# Patient Record
Sex: FEMALE | Race: WHITE | NOT HISPANIC OR LATINO | URBAN - METROPOLITAN AREA
[De-identification: names, ages, dates, MRNs, and addresses within clinical notes are randomized per-mention and may not be internally consistent; named-entity substitution may affect disease eponyms.]

---

## 2023-06-15 ENCOUNTER — TELEPHONE (OUTPATIENT)
Dept: GYNECOLOGIC ONCOLOGY | Facility: CLINIC | Age: 43
End: 2023-06-15

## 2023-06-15 NOTE — TELEPHONE ENCOUNTER
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 Her mother was diagnosed with triple negative PDL1-9 negative breast cancer. She states that her mother had cystic breasts prior to her diagnosis. Once mother was diagnosed she was encouraged to start mammograms which was more than 2 years ago. At her first mammogram she was informed that she had cystic breasts as well and was encouraged to get imaging every 6 months with alternating mammograms and ultrasounds. Patient has decided to stay in [place] and wants to establish care with [facility]. Advised that she would need to get the last 5 years of imaging sent to  before they would do mammogram. She will contact [facility]ec[facility] to see if they are able to powershare images. She did not mention seeing a genetics counselor. Original mammogram was done in [place]. Appt given. Forwarding to [facility] as an FYI.

## 2023-06-29 PROBLEM — Z91.89 AT HIGH RISK FOR BREAST CANCER: Status: ACTIVE | Noted: 2023-06-29

## 2023-08-18 DIAGNOSIS — N30.10 INTERSTITIAL CYSTITIS: Primary | ICD-10-CM

## 2023-08-18 LAB
BACTERIA UR QL AUTO: NORMAL /HPF
BILIRUB UR QL STRIP: NEGATIVE
CLARITY UR: CLEAR
COLOR UR: YELLOW
GLUCOSE UR STRIP-MCNC: NEGATIVE MG/DL
HGB UR QL STRIP.AUTO: NEGATIVE
HYALINE CASTS UR QL AUTO: NORMAL /LPF
KETONES UR QL STRIP: NEGATIVE
LEUKOCYTE ESTERASE UR QL STRIP.AUTO: NEGATIVE
NITRITE UR QL STRIP: NEGATIVE
PH UR STRIP.AUTO: 6 [PH] (ref 5–8)
PROT UR QL STRIP: NEGATIVE
RBC # UR STRIP: NORMAL /HPF
REF LAB TEST METHOD: NORMAL
SP GR UR STRIP: 1.02 (ref 1–1.03)
SQUAMOUS #/AREA URNS HPF: NORMAL /HPF
UROBILINOGEN UR QL STRIP: NORMAL
WBC # UR STRIP: NORMAL /HPF

## 2023-08-18 PROCEDURE — 87086 URINE CULTURE/COLONY COUNT: CPT | Performed by: UROLOGY

## 2023-08-18 PROCEDURE — 81001 URINALYSIS AUTO W/SCOPE: CPT | Performed by: UROLOGY

## 2023-08-18 RX ORDER — SULFAMETHOXAZOLE AND TRIMETHOPRIM 800; 160 MG/1; MG/1
1 TABLET ORAL 2 TIMES DAILY
Qty: 20 TABLET | Refills: 0 | Status: SHIPPED | OUTPATIENT
Start: 2023-08-18

## 2023-08-18 RX ORDER — PHENAZOPYRIDINE HYDROCHLORIDE 200 MG/1
200 TABLET, FILM COATED ORAL 3 TIMES DAILY PRN
Qty: 20 TABLET | Refills: 0 | Status: SHIPPED | OUTPATIENT
Start: 2023-08-18

## 2023-08-19 LAB — BACTERIA SPEC AEROBE CULT: NO GROWTH

## 2023-08-24 ENCOUNTER — OFFICE VISIT (OUTPATIENT)
Dept: UROLOGY | Facility: CLINIC | Age: 43
End: 2023-08-24
Payer: COMMERCIAL

## 2023-08-24 ENCOUNTER — TELEPHONE (OUTPATIENT)
Dept: UROLOGY | Facility: CLINIC | Age: 43
End: 2023-08-24

## 2023-08-24 VITALS — WEIGHT: 112 LBS | HEIGHT: 65 IN | BODY MASS INDEX: 18.66 KG/M2

## 2023-08-24 DIAGNOSIS — R10.2 PELVIC PAIN: ICD-10-CM

## 2023-08-24 DIAGNOSIS — N30.10 INTERSTITIAL CYSTITIS: Primary | ICD-10-CM

## 2023-08-24 DIAGNOSIS — R35.0 URINARY FREQUENCY: ICD-10-CM

## 2023-08-24 DIAGNOSIS — R39.15 URINARY URGENCY: ICD-10-CM

## 2023-08-24 LAB
BILIRUB BLD-MCNC: NEGATIVE MG/DL
CLARITY, POC: CLEAR
COLOR UR: YELLOW
EXPIRATION DATE: ABNORMAL
GLUCOSE UR STRIP-MCNC: NEGATIVE MG/DL
KETONES UR QL: NEGATIVE
LEUKOCYTE EST, POC: NEGATIVE
Lab: ABNORMAL
NITRITE UR-MCNC: NEGATIVE MG/ML
PH UR: 5 [PH] (ref 5–8)
PROT UR STRIP-MCNC: ABNORMAL MG/DL
RBC # UR STRIP: NEGATIVE /UL
SP GR UR: 1.01 (ref 1–1.03)
UROBILINOGEN UR QL: NORMAL

## 2023-08-24 NOTE — PROGRESS NOTES
Office Visit New Urology      Patient Name: Lacho Lama  : 1980   MRN: [id number]     Chief Complaint:    Chief Complaint   Patient presents with    Interstitial cytitis        Referring Provider: No ref. provider found    History of Present Illness: Lacho Lama is a 42 y.o. female who presents to Urology today for history of interstitial cystitis. 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 She presents to establish Urologic care. She reports she has previously completed PFPT for her IC and has used Myrebetriq 50mg as needed for IC flare up. She reports she IC has been well controlled for the last year until 2 weeks ago. She reports going on vacation and [place]. She reports urethral pain, bladder spasms, urinary urgency, urinary frequency and pelvic pain after vacation. She also suffered from a Crohns flare up at that time. She has been taking  Myrbetriq 50mg once daily for the last week that she had left over. She reports her symptoms are improving. She reports her pain and urgency/frequency have significantly improved over the last week.     Of note; she underwent Ex Lap in  for endometriosis. She underwent Cystoscopy at that time and was told her bladder was extremely inflamed.     UA today without infection or blood.   Urine culture ; no growth.  Subjective      Review of System: Review of Systems   Genitourinary:  Negative for decreased urine volume, difficulty urinating, dysuria, enuresis, flank pain, frequency, hematuria and urgency.    I have reviewed the ROS documented by my clinical staff, I have updated appropriately and I agree. JAMES Lawrence    Past Medical History:   Past Medical History:   Diagnosis Date    At high risk for breast cancer     Constipation     Crohn's disease     Endometriosis     Ovarian cyst     Palpitations        Past Surgical History:   Past Surgical History:   Procedure Laterality Date    CERVICAL FUSION ANTERIOR  WITH ARTIFICIAL DISCECTOMY IMPLANTATION  2013    C5-C6    CHOLECYSTECTOMY  2004    D & C HYSTEROSCOPY  2021    DIAGNOSTIC LAPAROSCOPY  2021    SALPINGECTOMY Bilateral 2021    WISDOM TOOTH EXTRACTION  2002       Family History:   Family History   Problem Relation Age of Onset    Breast cancer Mother 62        triple negative, Stage IV at diagnosis    Hypertension Father     Stroke Father     Coronary artery disease Father     Kidney disease Father     Pulmonary fibrosis Father     Hypertension Sister     Hyperlipidemia Sister     Mental illness Sister     Drug abuse Sister     Drug abuse Brother     Hypertension Brother     Ovarian cancer Maternal Grandmother 60    Colon cancer Paternal Grandmother 45    Stomach cancer Paternal Grandfather 55       Social History:   Social History     Socioeconomic History    Marital status:    Tobacco Use    Smoking status: Never     Passive exposure: Never    Smokeless tobacco: Never   Vaping Use    Vaping Use: Never used   Substance and Sexual Activity    Alcohol use: Yes    Drug use: Never    Sexual activity: Defer       Medications:     Current Outpatient Medications:     famotidine (PEPCID) 40 MG tablet, Take 1 tablet by mouth Daily., Disp: , Rfl:     Mirabegron ER (MYRBETRIQ) 50 MG tablet sustained-release 24 hour 24 hr tablet, Take 50 mg by mouth Daily for 180 days., Disp: 90 tablet, Rfl: 1    Quercetin 500 MG capsule, Take 2 capsules by mouth Daily., Disp: , Rfl:     Mirabegron ER (Myrbetriq) 50 MG tablet sustained-release 24 hour 24 hr tablet, Take 50 mg by mouth Daily., Disp: 30 tablet, Rfl: 0    phenazopyridine (Pyridium) 200 MG tablet, Take 1 tablet by mouth 3 (Three) Times a Day As Needed (Dysuria). (Patient not taking: Reported on 8/24/2023), Disp: 20 tablet, Rfl: 0    sulfamethoxazole-trimethoprim (Bactrim DS) 800-160 MG per tablet, Take 1 tablet by mouth 2 (Two) Times a Day., Disp: 20 tablet, Rfl: 0    Allergies:   Allergies   Allergen Reactions    Covid-19  "(Mrna) Vaccine Hives    Adhesive Tape Itching    Codeine Itching    Latex Itching       Bladder & Bowel Symptom Questionnaire    How often do you usually urinate during the day ?   4 - At least once an hour    2.   How many timed do you urinate at night?   1 - About every 3-4 hours   3.   What is the reason that you usually urinate?   4 - At least once an hour    4.   Once you get the urge to go, how long can you     comfortably delay?   3 - About every 1-2 hours   5.   How often do you get a sudden urge that makes you rush to the bathroom?   4 - At least once an hour    6.   How often does a sudden urge to urinate result in you leaking urine or wetting pads?   3 - About every 1-2 hours   7.  In your opinion, how good is your bladder control?   3 - About every 1-2 hours   8.  Do you have accidental bowel leakage?   no   9.  Do you have difficulty fully emptying your bladder?   yes   10.  Do you experience accidental leakage when performing some physical activity such as coughing, sneezing, laughing or exercise?   yes   11. Have you tried medications to help improve your symptoms?   yes   12. Would you be interested in learning about a long-lasting option that may help you with your symptoms?   no                                                                             Total Score   23     0-7 (Mild) 8-16 (Moderate) 17-28 (Severe)         Objective     Physical Exam:   Vital Signs:   Vitals:    08/24/23 1244   Weight: 50.8 kg (112 lb)   Height: 165.1 cm (65\")   PainSc: 0-No pain     Body mass index is 18.64 kg/mý.     Physical Exam  Vitals and nursing note reviewed.   Constitutional:       Appearance: Normal appearance.   HENT:      Head: Normocephalic and atraumatic.      Nose: Nose normal.      Mouth/Throat:      Mouth: Mucous membranes are moist.   Eyes:      Pupils: Pupils are equal, round, and reactive to light.   Pulmonary:      Effort: Pulmonary effort is normal.   Abdominal:      General: Abdomen is flat. "      Palpations: Abdomen is soft.   Musculoskeletal:         General: Normal range of motion.      Cervical back: Normal range of motion.   Skin:     General: Skin is warm and dry.      Capillary Refill: Capillary refill takes less than 2 seconds.   Neurological:      General: No focal deficit present.      Mental Status: She is alert.   Psychiatric:         Mood and Affect: Mood normal.     Labs:   Brief Urine Lab Results  (Last result in the past 365 days)        Color   Clarity   Blood   Leuk Est   Nitrite   Protein   CREAT   Urine HCG        08/24/23 1306 Yellow   Clear   Negative   Negative   Negative   Trace                   Urine Culture          8/18/2023    12:51   Urine Culture   Urine Culture No growth         No results found for: GLUCOSE, CALCIUM, NA, K, CO2, CL, BUN, CREATININE, EGFRIFAFRI, EGFRIFNONA, BCR, ANIONGAP    No results found for: WBC, HGB, HCT, MCV, PLT    Images:   US Breast Right Limited    Addendum Date: 7/11/2023    Outside films labeled with the patient's name dated 10/19/2020 as well as 3/17/2022 have now been received for comparison. Compared to prior studies there are multiple new masses and asymmetries bilaterally in addition to indeterminate calcifications in the anterior central left breast. Furthermore patient was BI-RADS 3 from most recent bilateral breast ultrasound for 6-month follow-up of bilateral masses in both breasts.  Recommendation:  1. Diagnostic bilateral mammogram using 2D 3D technique to include nipple in profile bilateral combination 2D 3D CC and MLO spot compression views bilateral nipple in profile combination 2D 3D anterior compression 90 degree lateral views, left CC and left 90 degree lateral magnification views. Patient will likely need follow-up ultrasound following these views.  2. Follow-up bilateral breast ultrasound patient is overdue for 6-month follow-up was dictated an outside study 3. Annual high rescreening breast MRI  This report was finalized on  7/11/2023 6:02 PM by Dr. Danitza Ko MD.      Result Date: 7/11/2023  BI-RADS 0 incomplete exam  RECOMMENDATION: Outside films for comparison.  The standard false-negative rate of mammography is between 10% and 25%. Complex patterns or increased breast density will markedly elevate the false-negative rate of mammography.    A results letter, in lay terminology, will be given to the patient at the conclusion of the exam.  This report was finalized on 7/10/2023 10:27 AM by Dr. Danitza Ko MD.      US Breast Bilateral Limited    Result Date: 7/18/2023  BI-RADS 2 benign finding  RECOMMENDATION: 1. Routine annual screening mammography in 1 year unless clinically indicated sooner.  2. High risk screening breast MRI.  The standard false-negative rate of mammography is between 10% and 25%. Complex patterns or increased breast density will markedly elevate the false-negative rate of mammography.   A results letter, in lay terminology, will be given to the patient at the conclusion of the exam.  This report was finalized on 7/18/2023 3:08 PM by Dr. Danitza Ko MD.      Mammo Diagnostic Digital Tomosynthesis Bilateral With CAD    Result Date: 7/18/2023  BI-RADS 2 benign finding  RECOMMENDATION: 1. Routine annual screening mammography in 1 year unless clinically indicated sooner.  2. High risk screening breast MRI.  The standard false-negative rate of mammography is between 10% and 25%. Complex patterns or increased breast density will markedly elevate the false-negative rate of mammography.   A results letter, in lay terminology, will be given to the patient at the conclusion of the exam.  This report was finalized on 7/18/2023 3:08 PM by Dr. Danitza Ko MD.      Mammo Diagnostic Digital Tomosynthesis Bilateral With CAD    Addendum Date: 7/11/2023    Outside films labeled with the patient's name dated 10/19/2020 as well as 3/17/2022 have now been received for comparison. Compared to prior studies there are  multiple new masses and asymmetries bilaterally in addition to indeterminate calcifications in the anterior central left breast. Furthermore patient was BI-RADS 3 from most recent bilateral breast ultrasound for 6-month follow-up of bilateral masses in both breasts.  Recommendation:  1. Diagnostic bilateral mammogram using 2D 3D technique to include nipple in profile bilateral combination 2D 3D CC and MLO spot compression views bilateral nipple in profile combination 2D 3D anterior compression 90 degree lateral views, left CC and left 90 degree lateral magnification views. Patient will likely need follow-up ultrasound following these views.  2. Follow-up bilateral breast ultrasound patient is overdue for 6-month follow-up was dictated an outside study 3. Annual high rescreening breast MRI  This report was finalized on 7/11/2023 6:02 PM by Dr. Danitza Ko MD.      Result Date: 7/11/2023  BI-RADS 0 incomplete exam  RECOMMENDATION: Outside films for comparison.  The standard false-negative rate of mammography is between 10% and 25%. Complex patterns or increased breast density will markedly elevate the false-negative rate of mammography.    A results letter, in lay terminology, will be given to the patient at the conclusion of the exam.  This report was finalized on 7/10/2023 10:27 AM by Dr. Danitza Ko MD.        Measures:   Tobacco:   Lacho Lama  reports that she has never smoked. She has never been exposed to tobacco smoke. She has never used smokeless tobacco.. I       Urine Incontinence: Patient reports that she is not currently experiencing any symptoms of urinary incontinence.   Assessment / Plan      Assessment/Plan:   Lacho Lama is a 42 y.o. female who presented today for history of interstitial cystitis with recent flare up of symptoms. She reports her symptoms have improved over the last week. Samples of Myrbetriq 50mg once daily provided in office, formal prescription sent.     We  discussed need for possible Atarax at night time for IC flare up. She will hold off at this time. We also discussed returning to PFPT if continues to have IC flares.  She will follow up in 3 months to assess symptoms.     Diagnoses and all orders for this visit:    1. Interstitial cystitis (Primary)  -     POC Urinalysis Dipstick, Automated    2. Urinary frequency  -     Mirabegron ER (Myrbetriq) 50 MG tablet sustained-release 24 hour 24 hr tablet; Take 50 mg by mouth Daily.  Dispense: 30 tablet; Refill: 0    3. Urinary urgency  -     Mirabegron ER (Myrbetriq) 50 MG tablet sustained-release 24 hour 24 hr tablet; Take 50 mg by mouth Daily.  Dispense: 30 tablet; Refill: 0    4. Pelvic pain       Follow Up:   Return in about 3 months (around 11/24/2023).    I spent approximately 30 minutes providing clinical care for this patient; including review of patient's chart and provider documentation, face to face time spent with patient in examination room (obtaining history, performing physical exam, discussing diagnosis and management options), placing orders, and completing patient documentation.     JAMES Lawrence  [facility] [place]

## 2023-08-25 ENCOUNTER — TELEPHONE (OUTPATIENT)
Dept: UROLOGY | Facility: CLINIC | Age: 43
End: 2023-08-25
Payer: COMMERCIAL

## 2023-08-25 NOTE — TELEPHONE ENCOUNTER
I messaged the patient on MyChart to let her know her myrbetriq was denied but she should have enough for when she has a flare up, but to please let us know if she needed anything else.

## 2023-09-21 ENCOUNTER — OFFICE VISIT (OUTPATIENT)
Dept: OBSTETRICS AND GYNECOLOGY | Facility: CLINIC | Age: 43
End: 2023-09-21
Payer: COMMERCIAL

## 2023-09-21 VITALS
DIASTOLIC BLOOD PRESSURE: 72 MMHG | HEIGHT: 65 IN | WEIGHT: 174 LBS | BODY MASS INDEX: 28.99 KG/M2 | SYSTOLIC BLOOD PRESSURE: 122 MMHG

## 2023-09-21 DIAGNOSIS — Z91.89 AT HIGH RISK FOR BREAST CANCER: ICD-10-CM

## 2023-09-21 DIAGNOSIS — Z01.419 WELL WOMAN EXAM WITH ROUTINE GYNECOLOGICAL EXAM: Primary | ICD-10-CM

## 2023-09-21 NOTE — PROGRESS NOTES
Subjective   Chief Complaint   Patient presents with    Annual Exam     Well woman exam. Pt states that she is high risk for breast and ovarian cancer would like to be screened yearly.     Lacho Lama is a 43 y.o. year old  presenting to be seen for her annual exam.  She had normal mammogram July of this year.  Recommendation for MRI screening as well. She is planning to schedule her mammogram and mris 6 months apart. She also has breast ultrasounds every 6 months.  She states her breast cancer was just diagnosed however she was diagnosed with stage IV.  Her maternal grandmother had ovarian cancer unsure age of diagnosis.  She states both sides of family have colon cancer history.  She has had screening mammograms and has had seroma adenoma removed.  She is current on all of her screenings currently.  Her last Pap was in  with normal cytology.  She reports history of abnormal Pap.  She is status post hysteroscopy polypectomy diagnostic laparoscopy with removal of endometriosis and bilateral salpingo-oophorectomy removal of paratubal cyst and right ovarian cyst in .  Due to her family history and abnormal mammograms with multiple breast cysts she is interested in preventative mastectomy however her insurance will not cover reconstruction cost.    SEXUAL Hx:  She is currently sexually active.  In the past year there there has been NO new sexual partners.    Condoms are never used.  She would not like to be screened for STD's at today's exam.  Current birth control method: tubal sterilization. vasectomy  She is happy with her current method of contraception and does not want to discuss alternative methods of contraception.  MENSTRUAL Hx:  Patient's last menstrual period was 2023 (exact date).  In the past 6 months her cycles have been regular, predictable and occur monthly. 23-29 day cycles. Her menstrual flow is typically normal.   Her bleeding lasts 3-7 days.Each month on average there are  "roughly 1 day(s) of very heavy flow.  This last period she had heavy bleeding with clots on day 3.   Intermenstrual bleeding is absent.    Post-coital bleeding is absent.  Dysmenorrhea: moderate and is not affecting her activities of daily living  PMS: is not affecting her activities of daily living  Her cycles are not a source of concern for her that she wishes to discuss today.  HEALTH Hx:  She exercises regularly: yes. 3x a week walking or running   She wears her seat belt: yes.  She has concerns about domestic violence: no.  OTHER THINGS SHE WANTS TO DISCUSS TODAY:  Nothing else    The following portions of the patient's history were reviewed and updated as appropriate:problem list, current medications, allergies, past family history, past medical history, past social history, and past surgical history.    Social History    Tobacco Use      Smoking status: Never      Smokeless tobacco: Never    Review of Systems  Constitutional POS: nothing reported    NEG: anorexia or night sweats   Genitourinary POS:  has ic    NEG: hematuria      Gastointestinal POS:  crohns with alternating diarrhea and constipation, hemorrhoids    NEG: bloating, change in bowel habits, melena, or reflux symptoms   Integument POS: nothing reported    NEG: moles that are changing in size, shape, color or rashes   Breast POS:  see hpi    NEG: persistent breast lump, skin dimpling, or nipple discharge        Objective   /72 (BP Location: Left arm, Patient Position: Sitting, Cuff Size: Adult)   Ht 165.1 cm (65\")   Wt 78.9 kg (174 lb)   LMP 09/17/2023 (Exact Date)   BMI 28.96 kg/m²     General:  well developed; well nourished  no acute distress   Skin:  No suspicious lesions seen   Thyroid: normal to inspection and palpation   Breasts:  Examined in supine position  Symmetric without masses or skin dimpling  Nipples normal without inversion, lesions or discharge  There are no palpable axillary nodes  Nipple piercings are present " bilaterally  Fibrocystic changes are present both breasts without a discrete mass  Dense breast tissue noted bilaterally   Abdomen: soft, non-tender; no masses  no umbilical or inguinal hernias are present  no hepato-splenomegaly   Pelvis: Clinical staff was present for exam  :  urethral meatus normal;  Vaginal:  normal pink mucosa without prolapse or lesions.  Cervix:  normal appearance. Nabothian cyst(s) present at 11 o'clock; menstrual bleeding noted  Uterus:  normal size, shape and consistency.  Adnexa:  normal bimanual exam of the adnexa.  Rectal:  digital rectal exam not performed; anus visually normal appearing.        Assessment   Well woman with routine gynecological exam  Family history of breast cancer with mom diagnosed with stage IV breast cancer at age 62.  She is status post genetic testing.  Recommendation was for diagnostic mammograms yearly bilateral breast ultrasounds every 6 months and breast MRI yearly.  Family history of ovarian cancer  Interstitial cystitis  Crohn's disease     Plan     Pap was done today per patient request even though it has been less then 3 years since her last Pap smear.  If she does not receive the results of the Pap within 2 weeks  time, she was instructed to call to find out the results.  I explained to Lacho that the recommendations for Pap smear interval in a low risk patient's has lengthened to 3 years time.  I encouraged her to be seen yearly for a full physical exam including breast and pelvic exam even during the off years when PAP's will not be performed.  Given her above average risk for breast cancer continue with yearly mammograms diagnostic, yearly breast MRI and every 6-month breast ultrasounds.  Discussed [facility] ovarian cancer screening program.  Number given for patient to contact for more information.  Samples of myrbetriq 50mg  given in office today.  The importance of keeping all planned follow-up and taking all medications as prescribed was  emphasized.  Today I discussed with Lacho the total recommended calcium intake for a premenopausal female is 1000 mg.  Ideally this should be from dietary sources.  I reviewed calcium content in various foods including milk, fortified orange juice and yogurt.  If she cannot get sufficient calcium through dietary means, it is recommended to supplement with either a multivitamin or calcium to reach her daily goal.  I also reviewed the difference in the bioavailability of calcium carbonate and calcium citrate containing supplements and the importance of taking calcium carbonate containing products with food.  Finally, vitamin D's role in calcium absorption was reviewed and a total daily vitamin D intake of 800 units was recommended.  Follow up for annual exam 1 year or prn    No orders of the defined types were placed in this encounter.         This note was electronically signed.    Vero Manning, APRN  September 21, 2023

## 2023-09-22 LAB — REF LAB TEST METHOD: NORMAL

## 2023-12-04 DIAGNOSIS — Z91.89 AT HIGH RISK FOR BREAST CANCER: Primary | ICD-10-CM
